# Patient Record
Sex: MALE | Race: WHITE | Employment: PART TIME | URBAN - METROPOLITAN AREA
[De-identification: names, ages, dates, MRNs, and addresses within clinical notes are randomized per-mention and may not be internally consistent; named-entity substitution may affect disease eponyms.]

---

## 2019-03-21 ENCOUNTER — APPOINTMENT (OUTPATIENT)
Dept: CT IMAGING | Age: 34
DRG: 140 | End: 2019-03-21
Payer: MEDICARE

## 2019-03-21 ENCOUNTER — HOSPITAL ENCOUNTER (INPATIENT)
Age: 34
LOS: 1 days | Discharge: HOME | DRG: 140 | End: 2019-03-22
Attending: EMERGENCY MEDICINE | Admitting: INTERNAL MEDICINE
Payer: MEDICARE

## 2019-03-21 ENCOUNTER — APPOINTMENT (OUTPATIENT)
Dept: GENERAL RADIOLOGY | Age: 34
DRG: 140 | End: 2019-03-21
Payer: MEDICARE

## 2019-03-21 DIAGNOSIS — T40.604A OPIATE OVERDOSE, UNDETERMINED INTENT, INITIAL ENCOUNTER (HCC): ICD-10-CM

## 2019-03-21 DIAGNOSIS — R40.4 TRANSIENT ALTERATION OF AWARENESS: Primary | ICD-10-CM

## 2019-03-21 DIAGNOSIS — R00.0 TACHYCARDIA: ICD-10-CM

## 2019-03-21 LAB
A/G RATIO: 1.4 (ref 1.1–2.2)
ACETAMINOPHEN LEVEL: <5 UG/ML (ref 10–30)
ALBUMIN SERPL-MCNC: 4.7 G/DL (ref 3.4–5)
ALP BLD-CCNC: 94 U/L (ref 40–129)
ALT SERPL-CCNC: 74 U/L (ref 10–40)
AMPHETAMINE SCREEN, URINE: POSITIVE
ANION GAP SERPL CALCULATED.3IONS-SCNC: 13 MMOL/L (ref 3–16)
AST SERPL-CCNC: 46 U/L (ref 15–37)
BARBITURATE SCREEN URINE: ABNORMAL
BASOPHILS ABSOLUTE: 0 K/UL (ref 0–0.2)
BASOPHILS RELATIVE PERCENT: 0.1 %
BENZODIAZEPINE SCREEN, URINE: ABNORMAL
BILIRUB SERPL-MCNC: 0.4 MG/DL (ref 0–1)
BILIRUBIN URINE: NEGATIVE
BLOOD, URINE: NEGATIVE
BUN BLDV-MCNC: 4 MG/DL (ref 7–20)
CALCIUM SERPL-MCNC: 9.4 MG/DL (ref 8.3–10.6)
CANNABINOID SCREEN URINE: ABNORMAL
CHLORIDE BLD-SCNC: 103 MMOL/L (ref 99–110)
CLARITY: CLEAR
CO2: 25 MMOL/L (ref 21–32)
COCAINE METABOLITE SCREEN URINE: ABNORMAL
COLOR: YELLOW
COMMENT UA: ABNORMAL
CREAT SERPL-MCNC: 0.8 MG/DL (ref 0.9–1.3)
D DIMER: 345 NG/ML DDU (ref 0–229)
EOSINOPHILS ABSOLUTE: 0 K/UL (ref 0–0.6)
EOSINOPHILS RELATIVE PERCENT: 0.1 %
EPITHELIAL CELLS, UA: 5 /HPF (ref 0–5)
ETHANOL: NORMAL MG/DL (ref 0–0.08)
GFR AFRICAN AMERICAN: >60
GFR NON-AFRICAN AMERICAN: >60
GLOBULIN: 3.4 G/DL
GLUCOSE BLD-MCNC: 160 MG/DL (ref 70–99)
GLUCOSE URINE: NEGATIVE MG/DL
HCT VFR BLD CALC: 51.1 % (ref 40.5–52.5)
HEMOGLOBIN: 17.3 G/DL (ref 13.5–17.5)
HYALINE CASTS: 6 /LPF (ref 0–8)
KETONES, URINE: NEGATIVE MG/DL
LACTIC ACID: 1.8 MMOL/L (ref 0.4–2)
LEUKOCYTE ESTERASE, URINE: NEGATIVE
LIPASE: 16 U/L (ref 13–60)
LYMPHOCYTES ABSOLUTE: 1.1 K/UL (ref 1–5.1)
LYMPHOCYTES RELATIVE PERCENT: 5.8 %
Lab: ABNORMAL
MCH RBC QN AUTO: 30.7 PG (ref 26–34)
MCHC RBC AUTO-ENTMCNC: 33.8 G/DL (ref 31–36)
MCV RBC AUTO: 90.8 FL (ref 80–100)
METHADONE SCREEN, URINE: ABNORMAL
MICROSCOPIC EXAMINATION: YES
MONOCYTES ABSOLUTE: 1.2 K/UL (ref 0–1.3)
MONOCYTES RELATIVE PERCENT: 6.6 %
NEUTROPHILS ABSOLUTE: 16 K/UL (ref 1.7–7.7)
NEUTROPHILS RELATIVE PERCENT: 87.4 %
NITRITE, URINE: NEGATIVE
OPIATE SCREEN URINE: POSITIVE
OXYCODONE URINE: POSITIVE
PDW BLD-RTO: 14.6 % (ref 12.4–15.4)
PH UA: 6
PH UA: 6 (ref 5–8)
PHENCYCLIDINE SCREEN URINE: ABNORMAL
PLATELET # BLD: 265 K/UL (ref 135–450)
PMV BLD AUTO: 8.7 FL (ref 5–10.5)
POTASSIUM REFLEX MAGNESIUM: 4.4 MMOL/L (ref 3.5–5.1)
PROPOXYPHENE SCREEN: ABNORMAL
PROTEIN UA: 30 MG/DL
RBC # BLD: 5.63 M/UL (ref 4.2–5.9)
RBC UA: 2 /HPF (ref 0–4)
SALICYLATE, SERUM: <0.3 MG/DL (ref 15–30)
SODIUM BLD-SCNC: 141 MMOL/L (ref 136–145)
SPECIFIC GRAVITY UA: 1.01 (ref 1–1.03)
TOTAL PROTEIN: 8.1 G/DL (ref 6.4–8.2)
TROPONIN: <0.01 NG/ML
URINE REFLEX TO CULTURE: YES
URINE TYPE: ABNORMAL
UROBILINOGEN, URINE: 0.2 E.U./DL
WBC # BLD: 18.3 K/UL (ref 4–11)
WBC UA: 9 /HPF (ref 0–5)

## 2019-03-21 PROCEDURE — 85025 COMPLETE CBC W/AUTO DIFF WBC: CPT

## 2019-03-21 PROCEDURE — 93005 ELECTROCARDIOGRAM TRACING: CPT | Performed by: EMERGENCY MEDICINE

## 2019-03-21 PROCEDURE — 71046 X-RAY EXAM CHEST 2 VIEWS: CPT

## 2019-03-21 PROCEDURE — 6360000004 HC RX CONTRAST MEDICATION: Performed by: PHYSICIAN ASSISTANT

## 2019-03-21 PROCEDURE — 87086 URINE CULTURE/COLONY COUNT: CPT

## 2019-03-21 PROCEDURE — 84484 ASSAY OF TROPONIN QUANT: CPT

## 2019-03-21 PROCEDURE — 83690 ASSAY OF LIPASE: CPT

## 2019-03-21 PROCEDURE — 2580000003 HC RX 258: Performed by: EMERGENCY MEDICINE

## 2019-03-21 PROCEDURE — 85379 FIBRIN DEGRADATION QUANT: CPT

## 2019-03-21 PROCEDURE — 96360 HYDRATION IV INFUSION INIT: CPT

## 2019-03-21 PROCEDURE — 81001 URINALYSIS AUTO W/SCOPE: CPT

## 2019-03-21 PROCEDURE — 71260 CT THORAX DX C+: CPT

## 2019-03-21 PROCEDURE — 83605 ASSAY OF LACTIC ACID: CPT

## 2019-03-21 PROCEDURE — 80053 COMPREHEN METABOLIC PANEL: CPT

## 2019-03-21 PROCEDURE — G0480 DRUG TEST DEF 1-7 CLASSES: HCPCS

## 2019-03-21 PROCEDURE — 80307 DRUG TEST PRSMV CHEM ANLYZR: CPT

## 2019-03-21 PROCEDURE — 99285 EMERGENCY DEPT VISIT HI MDM: CPT

## 2019-03-21 PROCEDURE — 36415 COLL VENOUS BLD VENIPUNCTURE: CPT

## 2019-03-21 RX ORDER — 0.9 % SODIUM CHLORIDE 0.9 %
1000 INTRAVENOUS SOLUTION INTRAVENOUS ONCE
Status: COMPLETED | OUTPATIENT
Start: 2019-03-21 | End: 2019-03-21

## 2019-03-21 RX ADMIN — SODIUM CHLORIDE 1000 ML: 9 INJECTION, SOLUTION INTRAVENOUS at 20:29

## 2019-03-21 RX ADMIN — IOPAMIDOL 75 ML: 755 INJECTION, SOLUTION INTRAVENOUS at 23:12

## 2019-03-21 ASSESSMENT — ENCOUNTER SYMPTOMS
PHOTOPHOBIA: 0
COUGH: 0
RHINORRHEA: 0
SHORTNESS OF BREATH: 0
COLOR CHANGE: 0
CHOKING: 0
BACK PAIN: 0
ABDOMINAL DISTENTION: 0
VOMITING: 0
WHEEZING: 0
NAUSEA: 0

## 2019-03-22 VITALS
WEIGHT: 161.16 LBS | TEMPERATURE: 98.2 F | OXYGEN SATURATION: 96 % | HEART RATE: 99 BPM | RESPIRATION RATE: 18 BRPM | HEIGHT: 70 IN | DIASTOLIC BLOOD PRESSURE: 89 MMHG | SYSTOLIC BLOOD PRESSURE: 130 MMHG | BODY MASS INDEX: 23.07 KG/M2

## 2019-03-22 PROBLEM — R00.0 TACHYCARDIA: Status: ACTIVE | Noted: 2019-03-22

## 2019-03-22 PROBLEM — R93.89 ABNORMAL CT OF THE CHEST: Status: ACTIVE | Noted: 2019-03-22

## 2019-03-22 PROBLEM — R06.82 TACHYPNEA: Status: ACTIVE | Noted: 2019-03-22

## 2019-03-22 PROBLEM — T14.91XA SUICIDE ATTEMPT (HCC): Status: ACTIVE | Noted: 2019-03-22

## 2019-03-22 LAB
ANION GAP SERPL CALCULATED.3IONS-SCNC: 12 MMOL/L (ref 3–16)
ATYPICAL LYMPHOCYTE RELATIVE PERCENT: 4 % (ref 0–6)
BASOPHILS ABSOLUTE: 0.1 K/UL (ref 0–0.2)
BASOPHILS RELATIVE PERCENT: 1 %
BUN BLDV-MCNC: 5 MG/DL (ref 7–20)
CALCIUM SERPL-MCNC: 9.2 MG/DL (ref 8.3–10.6)
CHLORIDE BLD-SCNC: 103 MMOL/L (ref 99–110)
CO2: 25 MMOL/L (ref 21–32)
CREAT SERPL-MCNC: 0.6 MG/DL (ref 0.9–1.3)
EOSINOPHILS ABSOLUTE: 0 K/UL (ref 0–0.6)
EOSINOPHILS RELATIVE PERCENT: 0 %
GFR AFRICAN AMERICAN: >60
GFR NON-AFRICAN AMERICAN: >60
GLUCOSE BLD-MCNC: 98 MG/DL (ref 70–99)
HCT VFR BLD CALC: 46.8 % (ref 40.5–52.5)
HEMOGLOBIN: 15.7 G/DL (ref 13.5–17.5)
LYMPHOCYTES ABSOLUTE: 4 K/UL (ref 1–5.1)
LYMPHOCYTES RELATIVE PERCENT: 29 %
MCH RBC QN AUTO: 30.2 PG (ref 26–34)
MCHC RBC AUTO-ENTMCNC: 33.6 G/DL (ref 31–36)
MCV RBC AUTO: 89.8 FL (ref 80–100)
MONOCYTES ABSOLUTE: 0.7 K/UL (ref 0–1.3)
MONOCYTES RELATIVE PERCENT: 6 %
NEUTROPHILS ABSOLUTE: 7.3 K/UL (ref 1.7–7.7)
NEUTROPHILS RELATIVE PERCENT: 60 %
PDW BLD-RTO: 14.7 % (ref 12.4–15.4)
PLATELET # BLD: 212 K/UL (ref 135–450)
PLATELET SLIDE REVIEW: ADEQUATE
PMV BLD AUTO: 8.6 FL (ref 5–10.5)
POTASSIUM REFLEX MAGNESIUM: 3.8 MMOL/L (ref 3.5–5.1)
PROCALCITONIN: 0.11 NG/ML (ref 0–0.15)
RBC # BLD: 5.21 M/UL (ref 4.2–5.9)
RBC # BLD: NORMAL 10*6/UL
SLIDE REVIEW: ABNORMAL
SODIUM BLD-SCNC: 140 MMOL/L (ref 136–145)
WBC # BLD: 12.1 K/UL (ref 4–11)

## 2019-03-22 PROCEDURE — 36415 COLL VENOUS BLD VENIPUNCTURE: CPT

## 2019-03-22 PROCEDURE — 2060000000 HC ICU INTERMEDIATE R&B

## 2019-03-22 PROCEDURE — 6370000000 HC RX 637 (ALT 250 FOR IP): Performed by: INTERNAL MEDICINE

## 2019-03-22 PROCEDURE — 93010 ELECTROCARDIOGRAM REPORT: CPT | Performed by: INTERNAL MEDICINE

## 2019-03-22 PROCEDURE — 84145 PROCALCITONIN (PCT): CPT

## 2019-03-22 PROCEDURE — 87040 BLOOD CULTURE FOR BACTERIA: CPT

## 2019-03-22 PROCEDURE — 85025 COMPLETE CBC W/AUTO DIFF WBC: CPT

## 2019-03-22 PROCEDURE — 2580000003 HC RX 258: Performed by: INTERNAL MEDICINE

## 2019-03-22 PROCEDURE — 6360000002 HC RX W HCPCS: Performed by: INTERNAL MEDICINE

## 2019-03-22 PROCEDURE — 80048 BASIC METABOLIC PNL TOTAL CA: CPT

## 2019-03-22 PROCEDURE — 99255 IP/OBS CONSLTJ NEW/EST HI 80: CPT | Performed by: INTERNAL MEDICINE

## 2019-03-22 PROCEDURE — 94760 N-INVAS EAR/PLS OXIMETRY 1: CPT

## 2019-03-22 RX ORDER — LEVOFLOXACIN 500 MG/1
500 TABLET, FILM COATED ORAL DAILY
Qty: 10 TABLET | Refills: 0 | Status: SHIPPED | OUTPATIENT
Start: 2019-03-22 | End: 2019-04-01

## 2019-03-22 RX ORDER — METRONIDAZOLE 500 MG/1
500 TABLET ORAL EVERY 8 HOURS SCHEDULED
Status: DISCONTINUED | OUTPATIENT
Start: 2019-03-22 | End: 2019-03-22 | Stop reason: HOSPADM

## 2019-03-22 RX ORDER — ONDANSETRON 2 MG/ML
4 INJECTION INTRAMUSCULAR; INTRAVENOUS EVERY 4 HOURS PRN
Status: DISCONTINUED | OUTPATIENT
Start: 2019-03-22 | End: 2019-03-22 | Stop reason: HOSPADM

## 2019-03-22 RX ORDER — SODIUM CHLORIDE 0.9 % (FLUSH) 0.9 %
10 SYRINGE (ML) INJECTION EVERY 12 HOURS SCHEDULED
Status: DISCONTINUED | OUTPATIENT
Start: 2019-03-22 | End: 2019-03-22 | Stop reason: HOSPADM

## 2019-03-22 RX ORDER — METRONIDAZOLE 500 MG/1
500 TABLET ORAL 3 TIMES DAILY
Qty: 30 TABLET | Refills: 0 | Status: SHIPPED | OUTPATIENT
Start: 2019-03-22 | End: 2019-04-01

## 2019-03-22 RX ORDER — FAMOTIDINE 20 MG/1
20 TABLET, FILM COATED ORAL 2 TIMES DAILY
Status: DISCONTINUED | OUTPATIENT
Start: 2019-03-22 | End: 2019-03-22 | Stop reason: HOSPADM

## 2019-03-22 RX ORDER — SODIUM CHLORIDE 0.9 % (FLUSH) 0.9 %
10 SYRINGE (ML) INJECTION PRN
Status: DISCONTINUED | OUTPATIENT
Start: 2019-03-22 | End: 2019-03-22 | Stop reason: HOSPADM

## 2019-03-22 RX ORDER — LACTOBACILLUS RHAMNOSUS GG 10B CELL
2 CAPSULE ORAL 2 TIMES DAILY WITH MEALS
Status: DISCONTINUED | OUTPATIENT
Start: 2019-03-22 | End: 2019-03-22 | Stop reason: HOSPADM

## 2019-03-22 RX ORDER — LEVOFLOXACIN 500 MG/1
500 TABLET, FILM COATED ORAL DAILY
Status: DISCONTINUED | OUTPATIENT
Start: 2019-03-22 | End: 2019-03-22 | Stop reason: HOSPADM

## 2019-03-22 RX ORDER — IPRATROPIUM BROMIDE AND ALBUTEROL SULFATE 2.5; .5 MG/3ML; MG/3ML
1 SOLUTION RESPIRATORY (INHALATION)
Status: DISCONTINUED | OUTPATIENT
Start: 2019-03-22 | End: 2019-03-22 | Stop reason: HOSPADM

## 2019-03-22 RX ADMIN — LEVOFLOXACIN 500 MG: 500 TABLET, FILM COATED ORAL at 10:49

## 2019-03-22 RX ADMIN — FAMOTIDINE 20 MG: 20 TABLET ORAL at 08:44

## 2019-03-22 RX ADMIN — Medication 2 CAPSULE: at 10:49

## 2019-03-22 RX ADMIN — METRONIDAZOLE 500 MG: 500 TABLET ORAL at 14:32

## 2019-03-22 RX ADMIN — ENOXAPARIN SODIUM 40 MG: 40 INJECTION SUBCUTANEOUS at 08:44

## 2019-03-22 RX ADMIN — SODIUM CHLORIDE, PRESERVATIVE FREE 10 ML: 5 INJECTION INTRAVENOUS at 08:46

## 2019-03-22 ASSESSMENT — ENCOUNTER SYMPTOMS
COUGH: 1
VOMITING: 0
SHORTNESS OF BREATH: 1

## 2019-03-22 ASSESSMENT — PAIN SCALES - GENERAL
PAINLEVEL_OUTOF10: 0

## 2019-03-24 LAB — URINE CULTURE, ROUTINE: NORMAL

## 2019-03-25 LAB
EKG ATRIAL RATE: 130 BPM
EKG DIAGNOSIS: NORMAL
EKG P AXIS: 58 DEGREES
EKG P-R INTERVAL: 160 MS
EKG Q-T INTERVAL: 288 MS
EKG QRS DURATION: 86 MS
EKG QTC CALCULATION (BAZETT): 423 MS
EKG R AXIS: 62 DEGREES
EKG T AXIS: 52 DEGREES
EKG VENTRICULAR RATE: 130 BPM

## 2019-03-27 LAB
BLOOD CULTURE, ROUTINE: NORMAL
CULTURE, BLOOD 2: NORMAL

## 2019-04-18 ENCOUNTER — HOSPITAL ENCOUNTER (EMERGENCY)
Age: 34
Discharge: HOME | End: 2019-04-19
Attending: EMERGENCY MEDICINE
Payer: MEDICARE

## 2019-04-18 ENCOUNTER — APPOINTMENT (OUTPATIENT)
Dept: CT IMAGING | Age: 34
End: 2019-04-18
Payer: MEDICARE

## 2019-04-18 DIAGNOSIS — T50.901A ACCIDENTAL DRUG OVERDOSE, INITIAL ENCOUNTER: Primary | ICD-10-CM

## 2019-04-18 LAB
A/G RATIO: 1.5 (ref 1.1–2.2)
ACETAMINOPHEN LEVEL: <5 UG/ML (ref 10–30)
ALBUMIN SERPL-MCNC: 4.6 G/DL (ref 3.4–5)
ALP BLD-CCNC: 85 U/L (ref 40–129)
ALT SERPL-CCNC: 40 U/L (ref 10–40)
ANION GAP SERPL CALCULATED.3IONS-SCNC: 13 MMOL/L (ref 3–16)
AST SERPL-CCNC: 45 U/L (ref 15–37)
BASOPHILS ABSOLUTE: 0 K/UL (ref 0–0.2)
BASOPHILS RELATIVE PERCENT: 0.2 %
BILIRUB SERPL-MCNC: 0.4 MG/DL (ref 0–1)
BUN BLDV-MCNC: 7 MG/DL (ref 7–20)
CALCIUM SERPL-MCNC: 9.4 MG/DL (ref 8.3–10.6)
CHLORIDE BLD-SCNC: 102 MMOL/L (ref 99–110)
CO2: 26 MMOL/L (ref 21–32)
CREAT SERPL-MCNC: 1.1 MG/DL (ref 0.9–1.3)
EOSINOPHILS ABSOLUTE: 0 K/UL (ref 0–0.6)
EOSINOPHILS RELATIVE PERCENT: 0.1 %
ETHANOL: NORMAL MG/DL (ref 0–0.08)
GFR AFRICAN AMERICAN: >60
GFR NON-AFRICAN AMERICAN: >60
GLOBULIN: 3.1 G/DL
GLUCOSE BLD-MCNC: 153 MG/DL (ref 70–99)
HCT VFR BLD CALC: 50.9 % (ref 40.5–52.5)
HEMOGLOBIN: 17.4 G/DL (ref 13.5–17.5)
LYMPHOCYTES ABSOLUTE: 1.1 K/UL (ref 1–5.1)
LYMPHOCYTES RELATIVE PERCENT: 5.2 %
MCH RBC QN AUTO: 30.4 PG (ref 26–34)
MCHC RBC AUTO-ENTMCNC: 34.1 G/DL (ref 31–36)
MCV RBC AUTO: 89.1 FL (ref 80–100)
MONOCYTES ABSOLUTE: 1.9 K/UL (ref 0–1.3)
MONOCYTES RELATIVE PERCENT: 9 %
NEUTROPHILS ABSOLUTE: 18.2 K/UL (ref 1.7–7.7)
NEUTROPHILS RELATIVE PERCENT: 85.5 %
PDW BLD-RTO: 13.2 % (ref 12.4–15.4)
PLATELET # BLD: 211 K/UL (ref 135–450)
PMV BLD AUTO: 9 FL (ref 5–10.5)
POTASSIUM SERPL-SCNC: 4.2 MMOL/L (ref 3.5–5.1)
RBC # BLD: 5.71 M/UL (ref 4.2–5.9)
SALICYLATE, SERUM: <0.3 MG/DL (ref 15–30)
SODIUM BLD-SCNC: 141 MMOL/L (ref 136–145)
TOTAL PROTEIN: 7.7 G/DL (ref 6.4–8.2)
WBC # BLD: 21.2 K/UL (ref 4–11)

## 2019-04-18 PROCEDURE — 85025 COMPLETE CBC W/AUTO DIFF WBC: CPT

## 2019-04-18 PROCEDURE — G0480 DRUG TEST DEF 1-7 CLASSES: HCPCS

## 2019-04-18 PROCEDURE — 70450 CT HEAD/BRAIN W/O DYE: CPT

## 2019-04-18 PROCEDURE — 99285 EMERGENCY DEPT VISIT HI MDM: CPT

## 2019-04-18 PROCEDURE — 93005 ELECTROCARDIOGRAM TRACING: CPT | Performed by: EMERGENCY MEDICINE

## 2019-04-18 PROCEDURE — 80053 COMPREHEN METABOLIC PANEL: CPT

## 2019-04-18 ASSESSMENT — PAIN SCALES - GENERAL
PAINLEVEL_OUTOF10: 0

## 2019-04-19 ENCOUNTER — APPOINTMENT (OUTPATIENT)
Dept: GENERAL RADIOLOGY | Age: 34
End: 2019-04-19
Payer: MEDICARE

## 2019-04-19 VITALS
DIASTOLIC BLOOD PRESSURE: 80 MMHG | RESPIRATION RATE: 18 BRPM | OXYGEN SATURATION: 97 % | HEIGHT: 70 IN | SYSTOLIC BLOOD PRESSURE: 126 MMHG | TEMPERATURE: 98 F | HEART RATE: 80 BPM | WEIGHT: 168 LBS | BODY MASS INDEX: 24.05 KG/M2

## 2019-04-19 LAB
AMPHETAMINE SCREEN, URINE: ABNORMAL
BARBITURATE SCREEN URINE: ABNORMAL
BENZODIAZEPINE SCREEN, URINE: ABNORMAL
BILIRUBIN URINE: NEGATIVE
BLOOD, URINE: NEGATIVE
CANNABINOID SCREEN URINE: ABNORMAL
CASTS 2: ABNORMAL /LPF
CASTS: ABNORMAL /LPF
CLARITY: ABNORMAL
COCAINE METABOLITE SCREEN URINE: POSITIVE
COLOR: YELLOW
COMMENT UA: ABNORMAL
EKG ATRIAL RATE: 111 BPM
EKG DIAGNOSIS: NORMAL
EKG P AXIS: 62 DEGREES
EKG P-R INTERVAL: 192 MS
EKG Q-T INTERVAL: 336 MS
EKG QRS DURATION: 90 MS
EKG QTC CALCULATION (BAZETT): 456 MS
EKG R AXIS: 52 DEGREES
EKG T AXIS: 12 DEGREES
EKG VENTRICULAR RATE: 111 BPM
EPITHELIAL CELLS, UA: 9 /HPF (ref 0–5)
GLUCOSE URINE: NEGATIVE MG/DL
KETONES, URINE: NEGATIVE MG/DL
LEUKOCYTE ESTERASE, URINE: NEGATIVE
Lab: ABNORMAL
METHADONE SCREEN, URINE: ABNORMAL
MICROSCOPIC EXAMINATION: YES
NITRITE, URINE: NEGATIVE
OPIATE SCREEN URINE: ABNORMAL
OXYCODONE URINE: ABNORMAL
PH UA: 5
PH UA: 6 (ref 5–8)
PHENCYCLIDINE SCREEN URINE: ABNORMAL
PROPOXYPHENE SCREEN: ABNORMAL
PROTEIN UA: 100 MG/DL
RBC UA: 3 /HPF (ref 0–4)
SPECIFIC GRAVITY UA: 1.02 (ref 1–1.03)
URINE REFLEX TO CULTURE: YES
URINE TYPE: ABNORMAL
UROBILINOGEN, URINE: 0.2 E.U./DL
WBC UA: 26 /HPF (ref 0–5)

## 2019-04-19 PROCEDURE — 93010 ELECTROCARDIOGRAM REPORT: CPT | Performed by: INTERNAL MEDICINE

## 2019-04-19 PROCEDURE — 81001 URINALYSIS AUTO W/SCOPE: CPT

## 2019-04-19 PROCEDURE — 80307 DRUG TEST PRSMV CHEM ANLYZR: CPT

## 2019-04-19 PROCEDURE — 87086 URINE CULTURE/COLONY COUNT: CPT

## 2019-04-19 PROCEDURE — 71046 X-RAY EXAM CHEST 2 VIEWS: CPT

## 2019-04-19 RX ORDER — 0.9 % SODIUM CHLORIDE 0.9 %
1000 INTRAVENOUS SOLUTION INTRAVENOUS ONCE
Status: DISCONTINUED | OUTPATIENT
Start: 2019-04-19 | End: 2019-04-19

## 2019-04-19 ASSESSMENT — ENCOUNTER SYMPTOMS
COUGH: 0
RESPIRATORY NEGATIVE: 1
ABDOMINAL PAIN: 0
CHEST TIGHTNESS: 0
NAUSEA: 0
GASTROINTESTINAL NEGATIVE: 1
VOMITING: 0
DIARRHEA: 0
SHORTNESS OF BREATH: 0

## 2019-04-19 ASSESSMENT — PAIN SCALES - GENERAL
PAINLEVEL_OUTOF10: 0
PAINLEVEL_OUTOF10: 0

## 2019-04-19 ASSESSMENT — PAIN SCALES - WONG BAKER: WONGBAKER_NUMERICALRESPONSE: 0

## 2019-04-19 NOTE — ED NOTES
Albert Wang NP informed that pt voided per urinal. Straight cath and ivf's dc'd.      Marlene Cramer RN  04/19/19 1943

## 2019-04-19 NOTE — ED NOTES
Pt voided per urinal. Specimen collected and sent to lab. Denies c/o pain.       Faye So RN  04/19/19 3973
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
## 2019-04-19 NOTE — ED NOTES
Pt resting in bed with eyes closed, VSS. No concerns voiced at this time. Will continue to monitor.       George Douglas RN  04/18/19 7442

## 2019-04-19 NOTE — ED NOTES
D/C: Order noted for d/c. Pt confirmed d/c paperwork  have correct name. Discharge and education instructions reviewed with patient. Teach-back successful. Pt verbalized understanding and signed d/c papers. Pt denied questions at this time. No acute distress noted. Patient instructed to follow-up as noted - return to emergency department if symptoms worsen. Patient verbalized understanding. Discharged per EDMD with discharge instructions. Pt discharged per ambulation  to private vehicle. Patient stable upon departure. Thanked patient for choosing [facility]) for care.           Aidan Castro, RN  04/19/19 3735
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
## 2019-04-19 NOTE — ED NOTES
Pt resting quietly at present without further c/o. Monitor = nsr. Pt ate a turkey sandwich and drank orange juice. Awaiting reeval per MD. Pt updated. Ellen.       Nano Perez RN  04/19/19 7252

## 2019-04-19 NOTE — ED NOTES
Report given to Danielle Alberts RN. No change in previous pt assessment. Denies pain at this time.       Lexi Terrell RN  04/19/19 9487

## 2019-04-19 NOTE — ED PROVIDER NOTES
I independently evaluated and obtained a history and physical on Alexandru Coreas. All diagnostic, treatment, and disposition assistants were made to myself in conjunction the advanced practice provider. For further details of this patient's emergency department encounter, please see the advanced practice provider's documentation. History: 78-year-old male presented to the ER after a possible drug overdose. Patient was found unresponsive by his mother and was given 8 mg of Narcan by EMS which she had response to. He is now awake and alert and states he did not take anything. He reports she's been sober for 1 year. Denies taking any oral narcotics as well as heroin. States the last thing he remembers was being with his brother. Denies chest pain, shortness of breath, fever, night sweats, chills, abdominal pain, dysuria, urgency, frequency. Physician Exam: Constitutional: No acute distress, heart: Regular rate rhythm, no murmur, respiratory: Clear breath sounds bilaterally, abdomen: Soft, nontender, nondistended, no rebound, no guarding, neurological: Alert and oriented ×3, moves all extremity, normal gait      MDM:  78-year-old male presents to the ER after possible overdose. Vital signs stable. Physical exam as above. He is alert and oriented ×3. He was observed for 6 hours without event. His workup shows 21,000 white blood cells however he has no other specific sign of infection. He denies any other complaints. This is possibly reactive secondary to Narcan. Rest of drug screen shows positive for cocaine but negative acetaminophen and salicylates. If he is stable and is been observed for 6 hours and believes he is stable for discharge at this time. We'll discharge home with outpatient follow-up and return precautions. Instructed to stop using narcotics.         Lorena Hairston DO  04/19/19 5040
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
## 2019-04-20 LAB — URINE CULTURE, ROUTINE: NORMAL

## 2019-06-15 ENCOUNTER — HOSPITAL ENCOUNTER (EMERGENCY)
Age: 34
Discharge: LEFT BEFORE DISCHARGE | End: 2019-06-15
Payer: MEDICARE

## 2019-06-15 VITALS
OXYGEN SATURATION: 90 % | WEIGHT: 165.34 LBS | TEMPERATURE: 98.1 F | HEIGHT: 74 IN | SYSTOLIC BLOOD PRESSURE: 168 MMHG | DIASTOLIC BLOOD PRESSURE: 111 MMHG | HEART RATE: 135 BPM | BODY MASS INDEX: 21.22 KG/M2 | RESPIRATION RATE: 29 BRPM

## 2019-06-15 DIAGNOSIS — Z53.29 LEFT AGAINST MEDICAL ADVICE: Primary | ICD-10-CM

## 2019-06-15 DIAGNOSIS — R00.0 TACHYCARDIA: ICD-10-CM

## 2019-06-15 PROCEDURE — 99283 EMERGENCY DEPT VISIT LOW MDM: CPT

## 2019-06-15 PROCEDURE — 93005 ELECTROCARDIOGRAM TRACING: CPT | Performed by: PHYSICIAN ASSISTANT

## 2019-06-15 ASSESSMENT — ENCOUNTER SYMPTOMS
VOMITING: 0
BACK PAIN: 0
COUGH: 0
DIARRHEA: 0
EYE PAIN: 0
NAUSEA: 0
ABDOMINAL PAIN: 0
SHORTNESS OF BREATH: 0

## 2019-06-16 LAB
EKG ATRIAL RATE: 129 BPM
EKG DIAGNOSIS: NORMAL
EKG P AXIS: 62 DEGREES
EKG P-R INTERVAL: 168 MS
EKG Q-T INTERVAL: 282 MS
EKG QRS DURATION: 86 MS
EKG QTC CALCULATION (BAZETT): 413 MS
EKG R AXIS: 45 DEGREES
EKG T AXIS: 48 DEGREES
EKG VENTRICULAR RATE: 129 BPM

## 2019-06-16 PROCEDURE — 93010 ELECTROCARDIOGRAM REPORT: CPT | Performed by: INTERNAL MEDICINE

## 2019-06-16 NOTE — ED PROVIDER NOTES
**EVALUATED BY ADVANCED PRACTICE PROVIDER**        [address]      Pt Name: José Miguel Cotton  ZYW:[id number]  [place] 1985  Date of evaluation: 6/15/2019  Provider: KATIE Greer      Chief Complaint:    Chief Complaint   Patient presents with    Altered Mental Status       Nursing Notes, Past Medical Hx, Past Surgical Hx, Social Hx, Allergies, and Family Hx were all reviewed and agreed with or any disagreements were addressed in the HPI.    HPI:  (Location, Duration, Timing, Severity,Quality, Assoc Sx, Context, Modifying factors)  This is a  35 y.o. male who presents via EMS for evaluation of concern of AMS. Patient states that his mother called EMS but she was concerned he was using drugs again. He denies this and states he would like to leave. There was no Narcan use per EMS report. I reviewed prior records. Seen 4/18/19 in ED and UDS was +cocaine. PastMedical/Surgical History:      Diagnosis Date    Opioid dependence with withdrawal ([facility] [address]      History reviewed. No pertinent surgical history. Medications:  Previous Medications    No medications on file         Review of Systems:  Review of Systems   Constitutional: Negative for chills, fatigue and fever. Eyes: Negative for pain. Respiratory: Negative for cough and shortness of breath. Cardiovascular: Negative for chest pain. Gastrointestinal: Negative for abdominal pain, diarrhea, nausea and vomiting. Genitourinary: Negative for dysuria. Musculoskeletal: Negative for back pain, neck pain and neck stiffness. Skin: Negative for rash. Neurological: Negative for dizziness and headaches. Psychiatric/Behavioral: Negative for confusion. Positives and Pertinent negatives as per HPI. Except as noted above in the ROS, problem specific ROS was completed and is negative.     Physical Exam:  Physical Exam   Constitutional: He is oriented to person, place, and time. He appears well-developed. No distress. HENT:   Head: Normocephalic and atraumatic. Eyes: Right eye exhibits no discharge. Left eye exhibits no discharge. Neck: Normal range of motion. Neck supple. Pulmonary/Chest: Effort normal. No stridor. No respiratory distress. Musculoskeletal: Normal range of motion. Neurological: He is alert and oriented to person, place, and time. No gross facial drooping. Moves all 4 extremities spontaneously. Skin: Skin is warm and dry. He is not diaphoretic. No pallor. Psychiatric: He has a normal mood and affect. Nursing note and vitals reviewed. MEDICAL DECISION MAKING    Vitals:    Vitals:    06/15/19 2002   BP: (!) 168/111   Pulse: 135   Resp: 29   Temp: 98.1 °F (36.7 °C)   TempSrc: Oral   SpO2: 90%   Weight: 165 lb 5.5 oz (75 kg)   Height: 6' 2\" (1.88 m)       LABS:Labs Reviewed - No data to display     Remainder of labs reviewed and werenegative at this time or not returned at the time of this note. RADIOLOGY:   Non-plain film images such as CT, Ultrasound and MRI are read by the radiologist. KATIE Bean have directly visualized the radiologic plain film image(s) with the below findings:        Interpretation per the Radiologist below, if available at the time of thisnote:    No orders to display        No results found. MEDICAL DECISION MAKING / ED COURSE:      PROCEDURES:   Procedures    None    Patient was given:  Medications - No data to display      Patient seen and examined today . See HPI for patient presentation. Patient is in no acute distress, nontoxic, afebrile. Tachycardic 135, satting 90% on RA. A&Ox3. At first patient was agitated and upset that EMS sheba him here and upset that his mom called EMS. After some time in the room patient calmed down and was apologetic in case he had offended me or the nurses. States he just wants to leave and does not want to be evaluated further.   The patient has decided to leave against medical advice. Jorge Cunningham was given the time and opportunity to ask questions and consider their options, and after the discussion, Jorge Cunningham wants to be discharged without further evaluation and treatment. Jorge Cunningham has normal mental status and adequate capacity to make medical decisions. . The risks have been explained to the patient, including worsening illness, chronic pain, permanent disability, and death. The patient was able to understand and state the risks and benefits. This was witnessed by the nurse and me. The patient was treated to the extent that he would allow, and knows that he may return for care at any time. I estimate there is LOW risk for SUICIDAL BEHAVIOR, HOMICIDAL BEHAVIOR, PSYCHOSIS, DANGEROUS OR VIOLENT BEHAVIOR, DISORIENTATION, OR MENTAL HEALTH CONDITION REQUIRING HOSPITALIZATION, thus I consider the discharge disposition reasonable. The patient tolerated their visit well. I evaluated the patient. The physician was available for consultation as needed. The patient and / or the family were informed of the results of anytests, a time was given to answer questions, a plan was proposed and they agreed with plan. CLINICAL IMPRESSION:  1. Left against medical advice    2. Tachycardia        DISPOSITION [place] 06/15/2019 08:13:40 PM      PATIENT REFERRED TO:  No follow-up provider specified.     DISCHARGE MEDICATIONS:  New Prescriptions    No medications on file       DISCONTINUED MEDICATIONS:  Discontinued Medications    No medications on file              (Please note the MDM and HPI sections of this note were completed with a voice recognition program.  Efforts weremade to edit the dictations but occasionally words are mis-transcribed.)    Electronically signed, Gogo Alas,           Gogo Alas  06/15/19 [address] [place], [place]  06/15/19 2032

## 2019-06-16 NOTE — ED TRIAGE NOTES
Pt denies any complaints, states his mother called ems for him after she claims he had change in mental status. Denies chest pain or sob,is noncompliant on bp meds.  Pt currently on meds for tooth abscess

## 2019-06-16 NOTE — ED PROVIDER NOTES
EKG interpretation    Sinus tachycardia rate of 130 with biatrial enlargement. Possible right ventricular strain pattern otherwise unremarkable.       Elpidio Bradley MD  06/15/19 2020

## 2020-01-29 ENCOUNTER — HOSPITAL ENCOUNTER (EMERGENCY)
Age: 35
Discharge: HOME | End: 2020-01-30
Attending: EMERGENCY MEDICINE
Payer: MEDICARE

## 2020-01-29 ENCOUNTER — APPOINTMENT (OUTPATIENT)
Dept: GENERAL RADIOLOGY | Age: 35
End: 2020-01-29
Payer: MEDICARE

## 2020-01-29 ENCOUNTER — APPOINTMENT (OUTPATIENT)
Dept: CT IMAGING | Age: 35
End: 2020-01-29
Payer: MEDICARE

## 2020-01-29 LAB
A/G RATIO: 1.4 (ref 1.1–2.2)
ACETAMINOPHEN LEVEL: <5 UG/ML (ref 10–30)
ALBUMIN SERPL-MCNC: 4 G/DL (ref 3.4–5)
ALP BLD-CCNC: 83 U/L (ref 40–129)
ALT SERPL-CCNC: 71 U/L (ref 10–40)
AMPHETAMINE SCREEN, URINE: ABNORMAL
ANION GAP SERPL CALCULATED.3IONS-SCNC: 11 MMOL/L (ref 3–16)
AST SERPL-CCNC: 54 U/L (ref 15–37)
BARBITURATE SCREEN URINE: ABNORMAL
BASOPHILS ABSOLUTE: 0 K/UL (ref 0–0.2)
BASOPHILS RELATIVE PERCENT: 0.5 %
BENZODIAZEPINE SCREEN, URINE: ABNORMAL
BILIRUB SERPL-MCNC: 0.3 MG/DL (ref 0–1)
BILIRUBIN URINE: NEGATIVE
BLOOD, URINE: NEGATIVE
BUN BLDV-MCNC: 10 MG/DL (ref 7–20)
CALCIUM SERPL-MCNC: 8.9 MG/DL (ref 8.3–10.6)
CANNABINOID SCREEN URINE: ABNORMAL
CHLORIDE BLD-SCNC: 101 MMOL/L (ref 99–110)
CLARITY: CLEAR
CO2: 28 MMOL/L (ref 21–32)
COCAINE METABOLITE SCREEN URINE: ABNORMAL
COLOR: YELLOW
CREAT SERPL-MCNC: 0.6 MG/DL (ref 0.9–1.3)
EOSINOPHILS ABSOLUTE: 0.1 K/UL (ref 0–0.6)
EOSINOPHILS RELATIVE PERCENT: 1.2 %
GFR AFRICAN AMERICAN: >60
GFR NON-AFRICAN AMERICAN: >60
GLOBULIN: 2.8 G/DL
GLUCOSE BLD-MCNC: 110 MG/DL (ref 70–99)
GLUCOSE URINE: NEGATIVE MG/DL
HCT VFR BLD CALC: 45.2 % (ref 40.5–52.5)
HEMOGLOBIN: 15.5 G/DL (ref 13.5–17.5)
KETONES, URINE: NEGATIVE MG/DL
LACTIC ACID: 1.1 MMOL/L (ref 0.4–2)
LEUKOCYTE ESTERASE, URINE: NEGATIVE
LYMPHOCYTES ABSOLUTE: 2 K/UL (ref 1–5.1)
LYMPHOCYTES RELATIVE PERCENT: 21.3 %
Lab: ABNORMAL
MCH RBC QN AUTO: 30.9 PG (ref 26–34)
MCHC RBC AUTO-ENTMCNC: 34.2 G/DL (ref 31–36)
MCV RBC AUTO: 90.3 FL (ref 80–100)
METHADONE SCREEN, URINE: ABNORMAL
MICROSCOPIC EXAMINATION: NORMAL
MONOCYTES ABSOLUTE: 0.7 K/UL (ref 0–1.3)
MONOCYTES RELATIVE PERCENT: 7.3 %
NEUTROPHILS ABSOLUTE: 6.5 K/UL (ref 1.7–7.7)
NEUTROPHILS RELATIVE PERCENT: 69.7 %
NITRITE, URINE: NEGATIVE
OPIATE SCREEN URINE: POSITIVE
OXYCODONE URINE: ABNORMAL
PDW BLD-RTO: 14.2 % (ref 12.4–15.4)
PH UA: 7
PH UA: 7 (ref 5–8)
PHENCYCLIDINE SCREEN URINE: ABNORMAL
PLATELET # BLD: 190 K/UL (ref 135–450)
PMV BLD AUTO: 8.6 FL (ref 5–10.5)
POTASSIUM REFLEX MAGNESIUM: 4.1 MMOL/L (ref 3.5–5.1)
PROPOXYPHENE SCREEN: ABNORMAL
PROTEIN UA: NEGATIVE MG/DL
RBC # BLD: 5 M/UL (ref 4.2–5.9)
SALICYLATE, SERUM: 0.5 MG/DL (ref 15–30)
SODIUM BLD-SCNC: 140 MMOL/L (ref 136–145)
SPECIFIC GRAVITY UA: 1.02 (ref 1–1.03)
TOTAL PROTEIN: 6.8 G/DL (ref 6.4–8.2)
TROPONIN: <0.01 NG/ML
URINE REFLEX TO CULTURE: NORMAL
URINE TYPE: NORMAL
UROBILINOGEN, URINE: 1 E.U./DL
WBC # BLD: 9.3 K/UL (ref 4–11)

## 2020-01-29 PROCEDURE — 96361 HYDRATE IV INFUSION ADD-ON: CPT

## 2020-01-29 PROCEDURE — 6360000004 HC RX CONTRAST MEDICATION: Performed by: NURSE PRACTITIONER

## 2020-01-29 PROCEDURE — 84484 ASSAY OF TROPONIN QUANT: CPT

## 2020-01-29 PROCEDURE — 70450 CT HEAD/BRAIN W/O DYE: CPT

## 2020-01-29 PROCEDURE — 71046 X-RAY EXAM CHEST 2 VIEWS: CPT

## 2020-01-29 PROCEDURE — 96360 HYDRATION IV INFUSION INIT: CPT

## 2020-01-29 PROCEDURE — 2580000003 HC RX 258: Performed by: NURSE PRACTITIONER

## 2020-01-29 PROCEDURE — G0480 DRUG TEST DEF 1-7 CLASSES: HCPCS

## 2020-01-29 PROCEDURE — 80307 DRUG TEST PRSMV CHEM ANLYZR: CPT

## 2020-01-29 PROCEDURE — 83605 ASSAY OF LACTIC ACID: CPT

## 2020-01-29 PROCEDURE — 99285 EMERGENCY DEPT VISIT HI MDM: CPT

## 2020-01-29 PROCEDURE — 85025 COMPLETE CBC W/AUTO DIFF WBC: CPT

## 2020-01-29 PROCEDURE — 80053 COMPREHEN METABOLIC PANEL: CPT

## 2020-01-29 PROCEDURE — 81003 URINALYSIS AUTO W/O SCOPE: CPT

## 2020-01-29 PROCEDURE — 70498 CT ANGIOGRAPHY NECK: CPT

## 2020-01-29 RX ORDER — 0.9 % SODIUM CHLORIDE 0.9 %
1000 INTRAVENOUS SOLUTION INTRAVENOUS ONCE
Status: COMPLETED | OUTPATIENT
Start: 2020-01-29 | End: 2020-01-29

## 2020-01-29 RX ADMIN — IOPAMIDOL 75 ML: 755 INJECTION, SOLUTION INTRAVENOUS at 22:34

## 2020-01-29 RX ADMIN — SODIUM CHLORIDE 1000 ML: 9 INJECTION, SOLUTION INTRAVENOUS at 19:50

## 2020-01-29 ASSESSMENT — ENCOUNTER SYMPTOMS
DIARRHEA: 0
GASTROINTESTINAL NEGATIVE: 1
RESPIRATORY NEGATIVE: 1
NAUSEA: 0
CHEST TIGHTNESS: 0
COUGH: 0
VOMITING: 0
SHORTNESS OF BREATH: 0
ABDOMINAL PAIN: 0

## 2020-01-30 VITALS
SYSTOLIC BLOOD PRESSURE: 119 MMHG | DIASTOLIC BLOOD PRESSURE: 82 MMHG | HEIGHT: 70 IN | OXYGEN SATURATION: 100 % | TEMPERATURE: 98 F | BODY MASS INDEX: 24.46 KG/M2 | RESPIRATION RATE: 21 BRPM | HEART RATE: 85 BPM | WEIGHT: 170.86 LBS
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
## 2020-01-30 NOTE — ED PROVIDER NOTES
[address] [facility]        Pt Name: Theodora Sams  MRN: [id number]  [place] 1985  Date of evaluation: 1/29/2020  Provider: NAVEED Mahmood CNP  PCP: No primary care provider on file. This patient was seen and evaluated by the attending physician Dr. Guero Stanton. CHIEF COMPLAINT       Chief Complaint   Patient presents with    Other     pt mother states a couple days ago pt had what she thinks is a seizure but pt did not want to come to hospital. pt states today his son told him he did the same thing. pt states they told him his eyes stay open but he shakes. pt states he was a heroin addict, hasnt used in 1.5 years but does still take percocets. HISTORY OF PRESENT ILLNESS   (Location/Symptom, Timing/Onset, Context/Setting, Quality, Duration, Modifying Factors, Severity)  Note limiting factors. Theodora Sams is a 29 y.o. male presents to the ED today with complaints of possible seizure-like activity. He states that he first noticed 2 occurrences 2 days ago, as well as one occurrence today. Both brief with no postictal period. He does not know how long they lasted. He states that they were witnessed by individuals that are not at his bedside. He denies any loss of bowel or bladder. No nausea or vomiting after the events. He is not taking any new medications. He states that he took unprescribed Percocet, that he bought off of a drug dealer. States that he used to shoot up heroin, however states that he has been clean off of heroin for the past year and a half, however does state that he takes unprescribed opioids. States that he came to the ED for further evaluation and treatment at the recommendation of his mother. Has no complaints at the time of arrival to the ED. Nursing Notes were all reviewed and agreed with or any disagreements were addressed in the HPI.     REVIEW OF SYSTEMS    (2-9 systems for level 4, 10 or more for level 5)     Review of Systems   Constitutional: Positive for fatigue. Negative for chills and fever. HENT: Negative. Respiratory: Negative. Negative for cough, chest tightness and shortness of breath. Cardiovascular: Negative for chest pain. Gastrointestinal: Negative. Negative for abdominal pain, diarrhea, nausea and vomiting. Musculoskeletal: Negative. Skin: Negative. Neurological: Positive for seizures. Psychiatric/Behavioral: Negative. All other systems reviewed and are negative. Positives and Pertinent negatives as per HPI. Except as noted above in the ROS, all other systems were reviewed and negative. PAST MEDICAL HISTORY     Past Medical History:   Diagnosis Date    Opioid dependence with withdrawal ([facility] [address]          SURGICAL HISTORY   History reviewed. No pertinent surgical history. CURRENTMEDICATIONS       There are no discharge medications for this patient. ALLERGIES     Patient has no known allergies. FAMILYHISTORY     History reviewed. No pertinent family history. SOCIAL HISTORY       Social History     Tobacco Use    Smoking status: Current Every Day Smoker     Packs/day: 1.00     Types: Cigarettes    Smokeless tobacco: Never Used   Substance Use Topics    Alcohol use: Not Currently    Drug use: Yes     Types: Opiates      Comment: percocet       SCREENINGS             PHYSICAL EXAM    (up to 7 for level 4, 8 or more for level 5)     ED Triage Vitals [01/29/20 1929]   BP Temp Temp Source Pulse Resp SpO2 Height Weight   (!) 143/95 98 °F (36.7 °C) Oral 95 14 96 % 5' 10\" (1.778 m) 170 lb 13.7 oz (77.5 kg)       Physical Exam  Vitals signs and nursing note reviewed. Constitutional:       General: He is not in acute distress. Appearance: Normal appearance. He is well-developed. He is not ill-appearing, toxic-appearing or diaphoretic. Interventions: He is not intubated.   HENT:      Head: Normocephalic and of the head and neck. No arterial vascular abnormality identified within the danielle. XR CHEST STANDARD (2 VW)   Final Result   No acute process. No significant change in chest findings compared to the April 2019 study. CT Head WO Contrast   Final Result   1. No intracranial hemorrhage or acute transcortical infarct. 2. Stable 1.2 cm increased density within the right danielle as described in body   of report. Diagnostic considerations would favor a cavernoma. This could   potentially be a source of the patient's seizure-like activity. This could   be further evaluated with an MRI of the brain with and without gadolinium on   a nonemergent basis. No results found. PROCEDURES   Unless otherwise noted below, none     Procedures    CRITICAL CARE TIME   N/A    CONSULTS:  None      EMERGENCY DEPARTMENT COURSE and DIFFERENTIAL DIAGNOSIS/MDM:   Vitals:    Vitals:    01/29/20 2146 01/29/20 2202 01/29/20 2346 01/30/20 0131   BP: 123/83 (!) 141/93 127/88 119/82   Pulse: 86 86 78 85   Resp: 14 16 12 21   Temp:       TempSrc:       SpO2: 98% 98% 99% 100%   Weight:       Height:           Patient was given thefollowing medications:  Medications   0.9 % sodium chloride bolus (0 mLs Intravenous Stopped 1/29/20 2150)   iopamidol (ISOVUE-370) 76 % injection 75 mL (75 mLs Intravenous Given 1/29/20 2234)       MDM: Patient was seen and evaluated per myself in conjunction with ED attending Dr. Pooja García. HPI and above for full presentation and physical exam.  He is afebrile hemodynamically stable nontoxic appearance on arrival to the ED. patient is neurologically intact, alert and oriented x4, no focal neurological deficits, moves all fours extremities spontaneously with no gait abnormalities on presentation to the ED.     Differential diagnoses include seizure, electrolyte derangement, TIA, CVA, intracranial normality including hemorrhage, infection, illicit drug use, overdose, UTI, infection, and Dr. Nolan Hernadez. He had the opportunity to ask questions about his medical condition. The patient was treated to the extent that he would allow, and knows that he may return for care at any time. FINAL IMPRESSION      1. Left-sided weakness    2. Opioid abuse ([facility] [address]    3. Seizure-like activity ([facility] [address]    4. Abnormal head CT    5. Cavernoma    6. Facial droop          DISPOSITION/PLAN   DISPOSITION Kennan 01/30/2020 01:35:09 AM      PATIENT REFERREDTO:  *[facility]  [address] [place] [id number]  [phone]    Schedule an appointment as soon as possible for a visit in 1 day      [facility] Emergency Department  [address] [address] [phone]  Go to   As needed, If symptoms worsen    [facility]  [place] [address] Neurology  [phone]  Schedule an appointment as soon as possible for a visit   As needed    Substance Abuse  Please visit [email or web address] for assistance with substance abuse issues  Schedule an appointment as soon as possible for a visit         DISCHARGE MEDICATIONS:  There are no discharge medications for this patient. DISCONTINUED MEDICATIONS:  There are no discharge medications for this patient.              (Please note that portions of this note were completed with a voice recognition program.  Efforts were made to edit the dictations but occasionally words are mis-transcribed.)    NAVEED Walker CNP (electronically signed)            NAVEED Walker CNP  01/30/20 1165

## 2020-01-30 NOTE — ED NOTES
Pt witnessed in room getting up and getting dressed. Pt removed himself from monitor. Pt asked where he is going pt stated \"I cant do this, I cant stay here. I will go for the other scan but then I will have to leave\" pt asked to please get back in gown and on monitor pt agreed. Pt ambulated to restroom without problem.       Abril Martínez RN  01/29/20 3711

## 2020-02-04 ENCOUNTER — HOSPITAL ENCOUNTER (EMERGENCY)
Age: 35
Discharge: HOME | End: 2020-02-04
Payer: MEDICARE

## 2020-02-04 VITALS
OXYGEN SATURATION: 98 % | BODY MASS INDEX: 24.46 KG/M2 | RESPIRATION RATE: 18 BRPM | WEIGHT: 170.86 LBS | HEART RATE: 81 BPM | TEMPERATURE: 98.6 F | DIASTOLIC BLOOD PRESSURE: 85 MMHG | SYSTOLIC BLOOD PRESSURE: 131 MMHG | HEIGHT: 70 IN

## 2020-02-04 PROCEDURE — 6370000000 HC RX 637 (ALT 250 FOR IP): Performed by: NURSE PRACTITIONER

## 2020-02-04 PROCEDURE — 99283 EMERGENCY DEPT VISIT LOW MDM: CPT

## 2020-02-04 RX ORDER — NALOXONE HYDROCHLORIDE 4 MG/.1ML
1 SPRAY NASAL PRN
Qty: 2 EACH | Refills: 0 | Status: SHIPPED | OUTPATIENT
Start: 2020-02-04

## 2020-02-04 RX ORDER — ACETAMINOPHEN 500 MG
1000 TABLET ORAL ONCE
Status: COMPLETED | OUTPATIENT
Start: 2020-02-04 | End: 2020-02-04

## 2020-02-04 RX ADMIN — ACETAMINOPHEN 1000 MG: 500 TABLET ORAL at 22:05

## 2020-02-04 ASSESSMENT — PAIN SCALES - GENERAL: PAINLEVEL_OUTOF10: 7

## 2020-02-05 NOTE — ED NOTES
Report to Marcia Chavez RN to assume care.      Donna Quijano RN  02/04/20 2128       Donna Quijano RN  02/04/20 2132

## 2020-02-05 NOTE — ED PROVIDER NOTES
[address]  [address] [address]  Dept: [phone]  Loc: [address] ENCOUNTER        This patient was not seen or evaluated by the attending physician. I evaluated this patient, the attending physician was available for consultation. CHIEF COMPLAINT    Chief Complaint   Patient presents with    Drug Overdose        HPI   Ircriss Liz is a 29 y.o. male who presents after a heroin overdose. The onset was just prior to arrival.  The context was he snorted a Percocet and was found unresponsive by mother. He was given Narcan by EMS and arrived to ER alert and oriented denying any complaints. REVIEW OF SYSTEMS   Cardiac: No chest pain  Pulmonary: No shortness of breath  Neurological: No headache or focal extremity weakness  Psychiatric: No suicidal or homicidal ideations, no hallucinations  All other systems reviewed and are negative. PAST MEDICAL & SURGICAL HISTORY   Past Medical History:   Diagnosis Date    Opioid dependence with withdrawal ([facility] [address]      History reviewed. No pertinent surgical history.      CURRENT MEDICATIONS   Current Outpatient Rx   Medication Sig Dispense Refill    naloxone (NARCAN) 4 MG/0.1ML LIQD nasal spray 1 spray by Nasal route as needed for Opioid Reversal 2 each 0        ALLERGIES   No Known Allergies     SOCIAL & FAMILY HISTORY   Social History     Socioeconomic History    Marital status: Single     Spouse name: None    Number of children: None    Years of education: None    Highest education level: None   Occupational History    None   Social Needs    Financial resource strain: None    Food insecurity:     Worry: None     Inability: None    Transportation needs:     Medical: None     Non-medical: None   Tobacco Use    Smoking status: Current Every Day Smoker     Packs/day: 1.00     Types: Cigarettes    Smokeless tobacco: Never Used   Substance and Sexual Activity    Alcohol use: Not Currently    Drug use: Yes     Types: Opiates      Comment: percocet    Sexual activity: Yes     Partners: Female   Lifestyle    Physical activity:     Days per week: None     Minutes per session: None    Stress: None   Relationships    Social connections:     Talks on phone: None     Gets together: None     Attends Yazdanism service: None     Active member of club or organization: None     Attends meetings of clubs or organizations: None     Relationship status: None    Intimate partner violence:     Fear of current or ex partner: None     Emotionally abused: None     Physically abused: None     Forced sexual activity: None   Other Topics Concern    None   Social History Narrative    None     History reviewed. No pertinent family history. PHYSICAL EXAM   VITAL SIGNS: BP (!) 133/93   Pulse 82   Temp 99.1 °F (37.3 °C) (Oral)   Resp 18   Ht 5' 10\" (1.778 m)   SpO2 99%   BMI 24.52 kg/m²    Constitutional: Well developed, well nourished   EYES: PERRL, nonicteric   HENT: Atraumatic, moist mucus membranes   Neck: No masses, no JVD   Respiratory: Lungs clear to auscultation bilaterally, no retractions   Cardiovascular: Regular rate, no murmurs   GI: Soft, nontender, normal bowel sounds   Musculoskeletal: No edema, no acute deformities   Integument:  Warm and dry skin, Normal color   Neurologic: Awake, alert, oriented x3, no aphasia, no slurred speech, CN II-XII intact, normal finger to nose test bilaterally, 5/5 strength in all 4 extremities, sensation to light touch intact bilaterally, patellar reflexes 2+ and equal bilaterally  Psychiatric:  The patient is cooperative, has good insight, has an appropriate affect   Vascular:  Radial pulses 2+ equal bilaterally     RADIOLOGY   No orders to display         PROCEDURES  none    ED COURSE & MEDICAL DECISION MAKING   Pertinent Labs & Imaging studies reviewed and interpreted.  (See chart for details)     Vitals:    02/04/20 2017 02/04/20 2045 02/04/20 2101 02/04/20 2116   BP: (!) 140/97 (!) 135/94 (!) 135/94 (!) 133/93   Pulse:  87  82   Resp:       Temp:       TempSrc:       SpO2: 100% 99% 97% 99%   Height:           Differential Diagnosis: Aspiration pneumonia, concurrent traumatic brain injury, Sepsis or other infection, Encephalopathy, Seizure, Metabolic derangement, Drug-Induced cardiac arrhythmia, other     Patient seen and examined today for opiate overdose. See HPI for patient presentation. Patient is hemodynamically stable, nontoxic, afebrile, and without tachycardia, tachypnea, and hypoxia. Physical exam as above. Well-appearing 63-year-old male lying in bed in no acute distress. Abdomen soft nontender. Lungs CTA bilaterally. Cardio exam is normal.  He is awake alert and oriented and neurovascularly intact without deficits. The patient was observed in the emergency department for 2 hours. The patient was kept on the cardiac monitor and pulse oximeter to watch for arrhythmias or hypoxemia. The patient had no changes on the cardiac monitor of any significance and remained with a normal O2 saturation. The patient maintained a normal level of consciousness. I instructed the patient to follow up as an outpatient at the rehab clinic. I instructed the patient to return to the ED immediately for any new or worsening symptoms. The patient verbalizes understanding. FINAL IMPRESSION   1. Opiate overdose, accidental or unintentional, initial encounter [facility])        PLAN  Discharge in good condition - awake, alert and ambulatory without ataxia. he was given appropriate outpatient clinic referrals.     (Please note that this note was completed with a voice recognition program.  Every attempt was made to edit the dictations, but inevitably there remain words that are mis-transcribed.)           NAVEED Yanes - CNP  02/04/20 1448

## 2020-02-05 NOTE — ED TRIAGE NOTES
Patient to ED via EMS for OD. Patient states he snorted half of a percocet and his mom found him unresponsive, and she called EMS. Patient given 4mg Narcan by EMS. Patient denies any additional drugs besides the Percocet. Patient declined resources for rehab, states he had an appointment with the [facility]. Patient currently A&Ox4. Denies suicidal ideation and has no thoughts of hurting himself. Patient stable on room air, 99%. BP elevated, 149/94. Patient cooperative with this RN. Respirations easy and unlabored. Skin warm and dry and appropriate for ethnicity. No acute distress noted at this time. Patient placed on continuous pulse ox on arrival to room 50.

## 2024-08-18 NOTE — LETTER
[facility] Emergency Department  [address] [place] [address]  Phone: [phone]    Patient: Joan Rodriguez  YOB: 1985  Date: 6/15/2019 Time: 8:10 PM    Leaving the Hospital Against Medical Advice    Chart #:[id number]    This will certify that I, the undersigned,    ______________________________________________________________________    A patient in the above named medical center, having requested discharge and removal from the medical center against the advice of my attending physician(s), hereby release the Emergency Department, its physicians, officers and employees, severally and individually, from any and all liability of any nature whatsoever for any injury or harm or complication of any kind that may result directly or indirectly, by reason of my terminating my stay as a patient from [facility], and hereby waive any and all rights of action I may now have or later acquire as a result of my voluntary departure from [facility] and the termination of my stay as a patient therein. This release is made with the full knowledge of the danger that may result from the action which I am taking.       Date:_______________________                         ___________________________                                                                                    Patient/Legal Representative    Witness:        ____________________________                          ___________________________  Nurse                                                                        Physician 97.6